# Patient Record
(demographics unavailable — no encounter records)

---

## 2025-06-09 NOTE — HISTORY OF PRESENT ILLNESS
[FreeTextEntry1] : 44-year-old woman referred by Dr. Domenico Kc with a 23-year history of hypertension and 7-year history of type 2 diabetes associated with obesity.  BP has been managed with amlodipine and spironolactone.  In July 2023, her creatinine was 1.48 with a GFR as low as 45, but she has recovered from what ever LARRY incident occurred.  Her creatinine is stable as of September at 0.72, BUN 11, , K4.2, normal PTH, normal urine microalbumin, A1c of 5.7.  In the last few months, she stopped going to the gym and her diet became worse, and she thinks she gained about 20 pounds.  A friend told her about phentermine and she is asking if she could take that.  I suggested avoiding it and that a better consideration would be a GLP-1 agonist.  I suggested that she consider an endocrinology consult and a trial of 1 of those drugs.

## 2025-06-09 NOTE — ASSESSMENT
[FreeTextEntry1] : 44-year-old woman with hypertension well-controlled, type 2 diabetes which has been well-controlled, recovery from LARRY, but poor lifestyle habits in the last few months with weight gain.  I believe she could benefit from a GLP-1 agonist and have suggested she consider an endocrinology consult.  She will remain on amlodipine and spironolactone.  She will return in 6 to 8 months if labs done today turn out well.

## 2025-06-09 NOTE — PHYSICAL EXAM
[General Appearance - In No Acute Distress] : in no acute distress [General Appearance - Alert] : alert [Sclera] : the sclera and conjunctiva were normal [PERRL With Normal Accommodation] : pupils were equal in size, round, and reactive to light [Outer Ear] : the ears and nose were normal in appearance [Neck Appearance] : the appearance of the neck was normal [Neck Cervical Mass (___cm)] : no neck mass was observed [Jugular Venous Distention Increased] : there was no jugular-venous distention [] : no respiratory distress [Auscultation Breath Sounds / Voice Sounds] : lungs were clear to auscultation bilaterally [Heart Rate And Rhythm] : heart rate was normal and rhythm regular [Heart Sounds] : normal S1 and S2 [Heart Sounds Gallop] : no gallops [Murmurs] : no murmurs [Heart Sounds Pericardial Friction Rub] : no pericardial rub